# Patient Record
Sex: FEMALE
[De-identification: names, ages, dates, MRNs, and addresses within clinical notes are randomized per-mention and may not be internally consistent; named-entity substitution may affect disease eponyms.]

---

## 2021-11-29 ENCOUNTER — FORM ENCOUNTER (OUTPATIENT)
Age: 66
End: 2021-11-29

## 2022-10-24 PROBLEM — Z00.00 ENCOUNTER FOR PREVENTIVE HEALTH EXAMINATION: Status: ACTIVE | Noted: 2022-10-24

## 2022-11-03 DIAGNOSIS — Z13.820 ENCOUNTER FOR SCREENING FOR OSTEOPOROSIS: ICD-10-CM

## 2022-11-07 ENCOUNTER — APPOINTMENT (OUTPATIENT)
Dept: OBGYN | Facility: CLINIC | Age: 67
End: 2022-11-07

## 2022-11-07 VITALS
WEIGHT: 152 LBS | RESPIRATION RATE: 16 BRPM | BODY MASS INDEX: 30.64 KG/M2 | OXYGEN SATURATION: 97 % | DIASTOLIC BLOOD PRESSURE: 82 MMHG | HEIGHT: 59 IN | SYSTOLIC BLOOD PRESSURE: 134 MMHG | HEART RATE: 83 BPM

## 2022-11-07 DIAGNOSIS — Z12.39 ENCOUNTER FOR OTHER SCREENING FOR MALIGNANT NEOPLASM OF BREAST: ICD-10-CM

## 2022-11-07 DIAGNOSIS — Z13.31 ENCOUNTER FOR SCREENING FOR DEPRESSION: ICD-10-CM

## 2022-11-07 DIAGNOSIS — D25.9 LEIOMYOMA OF UTERUS, UNSPECIFIED: ICD-10-CM

## 2022-11-07 DIAGNOSIS — Z01.419 ENCOUNTER FOR GYNECOLOGICAL EXAMINATION (GENERAL) (ROUTINE) W/OUT ABNORMAL FINDINGS: ICD-10-CM

## 2022-11-07 DIAGNOSIS — Z86.79 PERSONAL HISTORY OF OTHER DISEASES OF THE CIRCULATORY SYSTEM: ICD-10-CM

## 2022-11-07 DIAGNOSIS — Z12.4 ENCOUNTER FOR SCREENING FOR MALIGNANT NEOPLASM OF CERVIX: ICD-10-CM

## 2022-11-07 DIAGNOSIS — Z86.59 PERSONAL HISTORY OF OTHER MENTAL AND BEHAVIORAL DISORDERS: ICD-10-CM

## 2022-11-07 DIAGNOSIS — Z12.11 ENCOUNTER FOR SCREENING FOR MALIGNANT NEOPLASM OF COLON: ICD-10-CM

## 2022-11-07 PROCEDURE — G0101: CPT

## 2022-11-07 PROCEDURE — G0444 DEPRESSION SCREEN ANNUAL: CPT

## 2022-11-07 PROCEDURE — 82270 OCCULT BLOOD FECES: CPT

## 2022-11-07 NOTE — PLAN
[FreeTextEntry1] : I have spent 40 minutes of time on this encounter.  Greater than 50% of the face-to-face encounter time was spent on counseling and/or coordination of care for examination findings, differential, testing, management and planning.\par Yearly breast cancer screening with no current clinical or radiographic concerns.  Plan continued yearly imaging and breast follow up, sooner as needed.  I counseled the patient on current recommendations to reduce breast cancer risk including but not inclusive to regular exercise 20-30 minutes 3-4 times a week, low fat diet, limiting alcohol consumption, maintenance of ideal body weight, yearly imaging and self breast awareness.  Questions answered.  I encouraged in light of Covid 19, social distancing, frequent hand washing and precautions to stay healthy.\par \par 1)  Self breast exam instructions, calcium supplementation discussed with the patient.\par 2)  Mammography, lipid profile assessment, TSH screening, fasting glucose testing, colonoscopy screening were discussed with the patient.  Vitamin D supplementation\par 3)  Maintain healthy weight.\par 4)  Regular health maintenance with PCP.\par 5)  Remain tobacco free.\par 6)  Limit alcohol intake to less than 5 drinks per week.\par 7)  Osteoporosis screening.\par 8)  Annual cholesterol screening.\par 9)  Annual influenza vaccine.The importance of routine physical activity was reviewed and a goal of 150 minutes of moderately vigorous exercise per week was endorsed.\par

## 2022-11-07 NOTE — PHYSICAL EXAM
[Appropriately responsive] : appropriately responsive [Alert] : alert [No Acute Distress] : no acute distress [No Lymphadenopathy] : no lymphadenopathy [Regular Rate Rhythm] : regular rate rhythm [No Murmurs] : no murmurs [Clear to Auscultation B/L] : clear to auscultation bilaterally [Soft] : soft [Non-tender] : non-tender [Non-distended] : non-distended [No HSM] : No HSM [No Lesions] : no lesions [No Mass] : no mass [Oriented x3] : oriented x3 [Examination Of The Breasts] : a normal appearance [No Masses] : no breast masses were palpable [Labia Majora] : normal [Labia Minora] : normal [Uterine Adnexae] : normal [Normal rectal exam] : was normal [Normal Brown Stool] : was normal and brown [Occult Blood Positive] : was negative for occult blood analysis [Internal Hemorrhoid] : no internal hemorrhoids were present [External Hemorrhoid] : no external hemorrhoids were present [Skin Tags] : no residual hemorrhoidal skin tags [Normal] : was normal [None] : there was no rectal mass  [FreeTextEntry6] : Small fibroid uterus [FreeTextEntry8] : Small fibroid uterus anteverted anteflexed no adnexal masses.

## 2022-11-07 NOTE — HISTORY OF PRESENT ILLNESS
[Hot Flashes] : hot flashes [Night Sweats] : night sweats [No] : none [Currently Active] : currently active [Men] : men [TextBox_4] : The patient presents today for a routine GYN exam.  She offers no complaints.  We reviewed together in detail her past medical and surgical histories, allergies and medication usage, social and family history.   All questions were answered in easy to understand language.\par  [Mammogramdate] : 03/22

## 2022-11-09 LAB
HPV 16 E6+E7 MRNA CVX QL NAA+PROBE: NOT DETECTED
HPV18+45 E6+E7 MRNA CVX QL NAA+PROBE: NOT DETECTED

## 2022-11-14 LAB — CYTOLOGY CVX/VAG DOC THIN PREP: NORMAL

## 2022-11-15 ENCOUNTER — OFFICE (OUTPATIENT)
Dept: URBAN - METROPOLITAN AREA CLINIC 109 | Facility: CLINIC | Age: 67
Setting detail: OPHTHALMOLOGY
End: 2022-11-15
Payer: MEDICARE

## 2022-11-15 DIAGNOSIS — H04.221: ICD-10-CM

## 2022-11-15 DIAGNOSIS — H25.13: ICD-10-CM

## 2022-11-15 PROCEDURE — 92014 COMPRE OPH EXAM EST PT 1/>: CPT | Performed by: OPHTHALMOLOGY

## 2022-11-15 ASSESSMENT — REFRACTION_MANIFEST
OD_SPHERE: -0.50
OS_SPHERE: +0.25
OD_CYLINDER: -0.50
OD_SPHERE: +1.50
OD_VA1: 20/20
OD_SPHERE: +1.00
OS_VA1: 20/25
OD_AXIS: 170
OS_SPHERE: +2.00
OD_SPHERE: -1.00
OD_CYLINDER: -0.50
OD_VA1: 20/20-
OD_AXIS: 170
OS_SPHERE: +2.25

## 2022-11-15 ASSESSMENT — VISUAL ACUITY
OD_BCVA: 20/25-2
OS_BCVA: 20/30-1

## 2022-11-15 ASSESSMENT — TONOMETRY
OD_IOP_MMHG: 19
OS_IOP_MMHG: 18

## 2022-11-15 ASSESSMENT — CONFRONTATIONAL VISUAL FIELD TEST (CVF)
OD_FINDINGS: FULL
OS_FINDINGS: FULL

## 2022-11-15 ASSESSMENT — REFRACTION_AUTOREFRACTION
OD_AXIS: 166
OS_SPHERE: +0.25
OD_SPHERE: -0.50
OD_CYLINDER: -0.50

## 2022-11-15 ASSESSMENT — SPHEQUIV_DERIVED
OD_SPHEQUIV: -0.75
OD_SPHEQUIV: -0.75
OD_SPHEQUIV: 1.25

## 2022-11-15 ASSESSMENT — LID EXAM ASSESSMENTS: OD_COMMENTS: M
